# Patient Record
Sex: FEMALE
[De-identification: names, ages, dates, MRNs, and addresses within clinical notes are randomized per-mention and may not be internally consistent; named-entity substitution may affect disease eponyms.]

---

## 2024-09-17 DIAGNOSIS — Z80.0 FAMILY HISTORY OF MALIGNANT NEOPLASM OF DIGESTIVE ORGANS: ICD-10-CM

## 2024-09-17 DIAGNOSIS — K64.9 UNSPECIFIED HEMORRHOIDS: ICD-10-CM

## 2024-09-17 DIAGNOSIS — F17.200 NICOTINE DEPENDENCE, UNSPECIFIED, UNCOMPLICATED: ICD-10-CM

## 2024-09-17 PROBLEM — Z00.00 ENCOUNTER FOR PREVENTIVE HEALTH EXAMINATION: Status: ACTIVE | Noted: 2024-09-17

## 2024-09-17 NOTE — HISTORY OF PRESENT ILLNESS
[FreeTextEntry1] : 53 yo F presents for initial evaluation.  Referred by: EASTON Heck Referral Reason: Hemorrhoids  PMH: ileitis PSH: FH: Medications: Allergies:  Colonoscopy: 08/2024 significant for moderate internal hemorrhoids, poylps to transverse colon, sigmoid colon, and rectum.  pathology: polypectomy: ileum, distal. diagnosis: mild active chronic ileitis polypectomy, transverse. Diagnosis hyperplastic polyps   Pt presents for evaluation of hemorrhoids.

## 2024-09-18 ENCOUNTER — APPOINTMENT (OUTPATIENT)
Dept: COLORECTAL SURGERY | Facility: CLINIC | Age: 54
End: 2024-09-18